# Patient Record
Sex: FEMALE | Race: WHITE | Employment: OTHER | URBAN - METROPOLITAN AREA
[De-identification: names, ages, dates, MRNs, and addresses within clinical notes are randomized per-mention and may not be internally consistent; named-entity substitution may affect disease eponyms.]

---

## 2017-03-23 NOTE — PATIENT DISCUSSION
DRY EYES : Discussed with patient the importance of keeping the eye moist and the symptoms associated with dry eyes including blurry vision, tearing, burning, and jasson sensation. Advised patient to minimize use of any fans blowing directly on the face. Advised patient to continue with artificial tears 2-3 times daily.

## 2017-03-23 NOTE — PATIENT DISCUSSION
POAG, OU: INTRAOCULAR PRESSURE IS WITHIN ACCEPTABLE LIMITS. PT INSTRUCTED TO CONTINUE TIMOLOL AND RETURN FOR FOLLOW-UP AS SCHEDULED.

## 2017-06-13 NOTE — PATIENT DISCUSSION
POAG, OU:  I have explained to the patient at length regarding the diagnosis of primary open angle glaucoma and its pathophysiology. I have discussed the various treatment options including medications and surgery. I recommend that the patient begin medical treatment. I emphasized to the patient the importance of compliance with treatment and follow-up appointments.

## 2017-10-17 NOTE — PATIENT DISCUSSION
Continue: dorzolamide-timolol (dorzolamide-timolol): drops: 2-0.5% 1 drop twice a day into both eyes 06-

## 2017-11-28 NOTE — PATIENT DISCUSSION
SLT OS POST OP : IOP WITHIN ACCEPTABLE LIMITS , CONTINUE DORZOLAMIDE-TIMOLOL BID OU AND TRAVATAN Z QHS OU .  FOLLOW

## 2018-01-23 NOTE — PATIENT DISCUSSION
POAG, OU: INTRAOCULAR PRESSURE IS WITHIN ACCEPTABLE LIMITS. PT INSTRUCTED TO CONTINUE TIMOLOL AND TRAVATAN AND RETURN FOR FOLLOW-UP AS SCHEDULED.

## 2018-01-31 NOTE — PATIENT DISCUSSION
Continue: latanoprost (latanoprost): drops: 0.005% 1 drop at bedtime into both eyes 06- Patient understands there is an increased risk of corneal edema after cataract surgery.

## 2018-05-14 NOTE — PATIENT DISCUSSION
POAG, OU: INTRAOCULAR PRESSURE IS WITHIN ACCEPTABLE LIMITS. PT INSTRUCTED TO CONTINUE DORZOLAMIDE-TIMOLO BID OU AND TRAVAANZ QHS OU AND  AND RETURN FOR FOLLOW-UP AS SCHEDULED.

## 2018-08-16 NOTE — PATIENT DISCUSSION
DRY EYES : Discussed with patient the importance of keeping the eye moist and the symptoms associated with dry eyes including blurry vision, tearing, burning, and jasson sensation. Tear Lab was performed today to evaluate the severity of dryness. Advised patient to minimize use of any fans blowing directly on the face. Advised patient to continue with over the counter artificial tears 2-3 times daily.

## 2019-01-28 NOTE — PATIENT DISCUSSION
New Prescription: Travatan Z (travoprost): drops: 0.004% 1 drop at bedtime as directed into both eyes 01-

## 2019-01-28 NOTE — PATIENT DISCUSSION
POAG, OU: INTRAOCULAR PRESSURE IS WITHIN ACCEPTABLE LIMITS. PT INSTRUCTED TO CONTINUE TRAVATAN Z QHS OU AND DORZOLAMIDE/ TIMOLOL BID OU  AND RETURN FOR FOLLOW-UP AS SCHEDULED.

## 2019-01-28 NOTE — PATIENT DISCUSSION
GODWIN OU: USE ARTIFICIAL TEARS 2-3 TIMES A DAY AND ZADITOR BID OU FOR ITCHY EYES . Cecile Headley RETURN FOR FOLLOW-UP AS SCHEDULED.

## 2019-05-28 NOTE — PATIENT DISCUSSION
POAG, OU: INTRAOCULAR PRESSURE IS WITHIN ACCEPTABLE LIMITS. PT INSTRUCTED TO CONTINUE DORZOLAMIDE- TIMOLOL BID OU AND TRAVATAN Z QHS OU   RETURN FOR FOLLOW-UP AS SCHEDULED.

## 2019-09-26 NOTE — PATIENT DISCUSSION
POAG, OU: INTRAOCULAR PRESSURE IS WITHIN ACCEPTABLE LIMITS. PT INSTRUCTED TO CONTINUE dorzolamide-timolol AND RETURN FOR FOLLOW-UP AS SCHEDULED.

## 2019-11-26 NOTE — PATIENT DISCUSSION
POAG, OU: INTRAOCULAR PRESSURE IS WITHIN ACCEPTABLE LIMITS. PT INSTRUCTED TO CONTINUE DORZOLAMIDE TIMOLOL AND RETURN FOR FOLLOW-UP AS SCHEDULED.

## 2019-12-20 ENCOUNTER — NEW PATIENT COMPREHENSIVE (OUTPATIENT)
Dept: URBAN - METROPOLITAN AREA CLINIC 38 | Facility: CLINIC | Age: 69
End: 2019-12-20

## 2019-12-20 DIAGNOSIS — H52.223: ICD-10-CM

## 2019-12-20 DIAGNOSIS — H52.4: ICD-10-CM

## 2019-12-20 DIAGNOSIS — H52.03: ICD-10-CM

## 2019-12-20 PROCEDURE — 92004 COMPRE OPH EXAM NEW PT 1/>: CPT

## 2019-12-20 PROCEDURE — 92015 DETERMINE REFRACTIVE STATE: CPT

## 2019-12-20 ASSESSMENT — VISUAL ACUITY
OD_CC: J4
OS_CC: J4
OS_SC: 20/200
OS_CC: 20/25
OS_SC: J12>
OD_SC: J12>
OD_SC: 20/200
OD_CC: 20/25

## 2019-12-20 ASSESSMENT — TONOMETRY
OD_IOP_MMHG: 18
OS_IOP_MMHG: 18

## 2020-05-26 NOTE — PATIENT DISCUSSION
POAG, OU: INTRAOCULAR PRESSURE IS WITHIN ACCEPTABLE LIMITS. PT INSTRUCTED TO CONTINUE DORZOLAMIDE - TIMOLOL AND RETURN FOR FOLLOW-UP AS SCHEDULED.

## 2020-10-13 NOTE — PATIENT DISCUSSION
POAG FOLLOWUP: IOP SLIGHTLY ELEVATED BUT WILL CONTINUE WITH DORZOLAMIDE-TIMOLOL BID OU. STRESSED IMPORTANCE TO PT OF USING DROP TWICE A DAY AND NOT JUST ONCE.  WILL SEE BACK IN 4 MONTHS TO CHECK ON PRESSURE

## 2021-02-18 NOTE — PATIENT DISCUSSION
POAG, OU: ELEVATED INTRAOCULAR PRESSURE, OU. PRESCRIBED LATANOPROSY QHS OU BECAUSE PATIENT HAS BEEN NON COMPLIANT WITH COMBIGAN . RETURN FOR FOLLOW AS SCHEDULED.

## 2021-06-22 NOTE — PATIENT DISCUSSION
POAG, OU: ELEVATED INTRAOCULAR PRESSURE, OU. INSTRUCTED PATIENT TO RE START DORZOLAMIDE/TIMOLOL GTTS BIS OU . RETURN FOR FOLLOW AS SCHEDULED.

## 2021-09-22 NOTE — PATIENT DISCUSSION
POAG, OU- IOP ELEVATED OU TODAY. PATIENT REPORTS USUALLY ONLY GETS AM DROP IN, FORGETS NIGHTTIME DOSE. PATIENT ED ON IMPORTANCE OF BETTER COMPLIANCE WITH TREATMENT. PATIENT ED CAN PUT SECOND DROP IN SOONER, AROUND DINNER SINCE SHE OFTEN FALLS ASLEEP BEFORE NIGHT DOES. CONTINUE DORZ-TIMOLOL BID OU. RTX FOR IOP CHECK IN 1 MONTH.

## 2021-10-28 NOTE — PROCEDURE NOTE: CLINICAL
PROCEDURE NOTE: SLT OS. Diagnosis: POAG, Moderate. Anesthesia: Topical. Prep: Betadine Flush. Prior to treatment, the risks/benefits/alternatives were discussed. The patient wished to proceed with procedure. Lens:  SLT laser lens with goniosol. Power: 0.9 mj 100 bursts. Application 731 Degrees. Patient tolerated procedure well. There were no complications. Post-op instructions given. Post-op IOP =  mmHg. Roscoe Coello

## 2021-11-08 NOTE — PATIENT DISCUSSION
Sent in new Rx for travoprost QHS OU and dorz-timolol BID OU. Wrote out instructions for patient today and discussed with daughter.

## 2022-02-10 NOTE — PATIENT DISCUSSION
IOP WITHIN ACCEPTABLE LIMITS. PATIENT NOT COMPLIANT WITH DROPS. SHE IS TO RESTART DROZ-TIMOLOL  AND TRAVOPROST AS DIRECTED.

## 2025-06-30 NOTE — PATIENT DISCUSSION
Occupational Therapy Visit    Visit Type: Daily Treatment Note  Visit: 23  Referring Provider: Mino Day MD  Medical Diagnosis (from order): Z74.09, Z78.9 - Impaired mobility and ADLs  I63.81 - Stroke of right basal ganglia  (CMD)     SUBJECTIVE                                                                                                               \"My hand is a little swollen\"     OBJECTIVE                                                                                                                           Functional Ambulation  - Assistance: stand by assist and modified independent  - Assistive device: 2-wheeled walker  - Distance (ft):30  - Surface: even           Treatment     Neuromuscular Re-Education  Performed dynamic activities to improve ADLs, strength, and coordination in order to translate into functional performance and activities of daily living such as grooming/hygiene, dressing, and tasks of daily living     Patient instructed in and performed left upper extremity hand strengthening and fine motor coordination:    1) Using pink resistance therputty, patient used john UE to add and remove 20 beads from putty using lateral and 3 point pinch techniques  2) Using key pinch patient pushed x30 coins into pink mod resistance theraputty  > Min cues to reduce elbow compensation  3) Used intrinsic musculature between each pair of fingers in LUE to transfer dice from tabletop for 4 sets x 15 reps    - Seated Edema Reduction Massage - 1 sets - 10 reps each finger and palm      Skilled input: verbal instruction/cues    Home Exercise Program  Access Code: [email or web address]  URL: [email or web address]  Date: 06/30/2025  Prepared by: Anjana Noyola    Exercises  - Thumb Stabilization Palmar Abduction:   - 1 x daily - 7 x weekly - 3 sets - 10 reps  - Thumb Radial Adduction with Thumb Flexion AROM on Table  - 1 x daily - 7 x weekly - 3 sets - 10 reps  - Seated Chair Push Ups  - 1 x daily - 7 x  POAG, OU: INTRAOCULAR PRESSURE IS WITHIN ACCEPTABLE LIMITS. PT INSTRUCTED TO CONTINUE DORZOLAMIDE AND TRAVATAN AND RETURN FOR FOLLOW-UP AS SCHEDULED. weekly - 3 sets - 10 reps  - Seated Alternating Supination and Pronation Coordination  - 1 x daily - 7 x weekly - 3 sets - 10 reps  - Wrist Alphabet  - 1 x daily - 7 x weekly - 3 sets - 10 reps  - Thumb Opposition  - 1 x daily - 7 x weekly - 3 sets - 10 reps  - Hand Active Tabletop  - 1 x daily - 7 x weekly - 3 sets - 10 reps  - Finger Spreading  - 1 x daily - 7 x weekly - 3 sets - 10 reps  - Seated Digit Tendon Gliding  - 1 x daily - 7 x weekly - 3 sets - 10 reps  - Seated Single Finger Extension  - 1 x daily - 7 x weekly - 3 sets - 10 reps  - Paper Crumpling  - 1 x daily - 7 x weekly - 3 sets - 10 reps  - Picking Up Coins  - 1 x daily - 7 x weekly - 3 sets - 10 reps  - Seated Pen Rolling  - 1 x daily - 7 x weekly - 3 sets - 10 reps  - Paper Clip Chain  - 1 x daily - 7 x weekly - 3 sets - 10 reps  - [place]  from Putty  - 1 x daily - 7 x weekly - 3 sets - 10 reps  - Removing Marbles from Putty  - 1 x daily - 7 x weekly - 3 sets - 10 reps  - Seated [place]   - 1 x daily - 7 x weekly - 3 sets - 10 reps  - Picking up Beans  - 1 x daily - 7 x weekly - 3 sets - 10 reps  - Seated Pen Hurdles  - 1 x daily - 7 x weekly - 3 sets - 10 reps  - Quick Finger Spreading with Rubber Band  - 1 x daily - 7 x weekly - 3 sets - 10 reps  - Putty Squeezes  - 1 x daily - 7 x weekly - 3 sets - 10 reps  - Tip Pinch with Putty  - 1 x daily - 7 x weekly - 3 sets - 10 reps  - Key Pinch with Putty  - 1 x daily - 7 x weekly - 3 sets - 10 reps  - 3-Point Pinch with Putty  - 1 x daily - 7 x weekly - 3 sets - 10 reps  - [place]  from Putty  - 1 x daily - 7 x weekly - 3 sets - 10 reps  - Rolling Putty on Table  - 1 x daily - 7 x weekly - 3 sets - 10 reps  - Seated Finger Extension with Putty  - 1 x daily - 7 x weekly - 3 sets - 10 reps  - Finger Pinch and Pull with Putty  - 1 x daily - 7 x weekly - 3 sets - 10 reps  - Seated Edema Reduction Massage  - 1 x daily - 7 x weekly - 3 sets - 10 reps    Patient Education  - Fine Motor  Task Ideas  - In-Hand Manipulation Tasks  - Fine Motor Task Ideas  - In-Hand Manipulation Tasks      ASSESSMENT                                                                                                            Patient seen in AM session to address LUE strengthening/use, activity tolerance, and standing balance in order to increase patient's ease and performance with self-care and home management tasks. Patient tolerated session well, demonstrating increased LUE fine motor coordination and motor planning  using intrinsic muscles to  dice from tabletop. Reviewed massage technique to reduce hand swelling. Skilled OP OT is medically necessary and patient to benefit from skilled OT intervention to progress range of motion, strength and coordination in LUE to improve functional LUE use for ADLs.    Education:   - Results of above outlined education: Demonstrates understanding    PLAN                                                                                                                           Suggestions for next session as indicated: Progress per plan of care           Therapy procedure time and total treatment time can be found documented on the Time Entry flowsheet